# Patient Record
Sex: MALE | Employment: FULL TIME | URBAN - METROPOLITAN AREA
[De-identification: names, ages, dates, MRNs, and addresses within clinical notes are randomized per-mention and may not be internally consistent; named-entity substitution may affect disease eponyms.]

---

## 2017-07-08 ENCOUNTER — HOSPITAL ENCOUNTER (OUTPATIENT)
Age: 32
Discharge: HOME | End: 2017-07-08
Attending: EMERGENCY MEDICINE
Payer: COMMERCIAL

## 2017-07-08 ENCOUNTER — APPOINTMENT (OUTPATIENT)
Dept: CT IMAGING | Age: 32
End: 2017-07-08
Attending: EMERGENCY MEDICINE
Payer: COMMERCIAL

## 2017-07-08 VITALS
OXYGEN SATURATION: 99 % | DIASTOLIC BLOOD PRESSURE: 97 MMHG | BODY MASS INDEX: 30 KG/M2 | WEIGHT: 210 LBS | SYSTOLIC BLOOD PRESSURE: 132 MMHG | TEMPERATURE: 98 F | RESPIRATION RATE: 16 BRPM | HEART RATE: 84 BPM

## 2017-07-08 DIAGNOSIS — K59.00 CONSTIPATION, UNSPECIFIED CONSTIPATION TYPE: Primary | ICD-10-CM

## 2017-07-08 LAB
#LYMPHOCYTE IC: 3.6 X10ˆ3/UL (ref 0.9–3.2)
#MXD IC: 1 X10ˆ3/UL (ref 0.1–1)
#NEUTROPHIL IC: 8 X10ˆ3/UL (ref 1.3–6.7)
CREAT SERPL-MCNC: 1 MG/DL (ref 0.7–1.2)
GLUCOSE BLD-MCNC: 114 MG/DL (ref 65–99)
HCT IC: 53.3 % (ref 37–54)
HGB IC: 17.4 G/DL (ref 13–17)
ISTAT BLOOD GAS TCO2: 26 MMOL/L (ref 22–32)
ISTAT BUN: 18 MG/DL (ref 8–20)
ISTAT CHLORIDE: 98 MMOL/L (ref 101–111)
ISTAT HEMATOCRIT: 53 % (ref 37–54)
ISTAT IONIZED CALCIUM: 1.08 MMOL/L (ref 1.12–1.32)
ISTAT POTASSIUM: 4.5 MMOL/L (ref 3.6–5.1)
ISTAT SODIUM: 137 MMOL/L (ref 136–144)
LYMPHOCYTES NFR BLD AUTO: 28.3 %
MCH IC: 28.2 PG (ref 27–33.2)
MCHC IC: 32.6 G/DL (ref 31–37)
MCV IC: 86.2 FL (ref 80–99)
MIXED CELL %: 8.2 %
NEUTROPHILS NFR BLD AUTO: 63.5 %
PLT IC: 371 X10ˆ3/UL (ref 150–450)
POCT BILIRUBIN URINE: NEGATIVE
POCT GLUCOSE URINE: NEGATIVE MG/DL
POCT KETONE URINE: NEGATIVE MG/DL
POCT LEUKOCYTE ESTERASE URINE: NEGATIVE
POCT NITRITE URINE: NEGATIVE
POCT PH URINE: 6 (ref 5–8)
POCT SPECIFIC GRAVITY URINE: 1.02
POCT URINE CLARITY: CLEAR
POCT URINE COLOR: YELLOW
POCT UROBILINOGEN URINE: 0.2 MG/DL
RBC IC: 6.18 X10ˆ6/UL (ref 4.3–5.7)
WBC IC: 12.6 X10ˆ3/UL (ref 4–13)

## 2017-07-08 PROCEDURE — 99215 OFFICE O/P EST HI 40 MIN: CPT

## 2017-07-08 PROCEDURE — 85025 COMPLETE CBC W/AUTO DIFF WBC: CPT | Performed by: EMERGENCY MEDICINE

## 2017-07-08 PROCEDURE — 96374 THER/PROPH/DIAG INJ IV PUSH: CPT

## 2017-07-08 PROCEDURE — 81002 URINALYSIS NONAUTO W/O SCOPE: CPT | Performed by: EMERGENCY MEDICINE

## 2017-07-08 PROCEDURE — 74176 CT ABD & PELVIS W/O CONTRAST: CPT | Performed by: EMERGENCY MEDICINE

## 2017-07-08 PROCEDURE — 99214 OFFICE O/P EST MOD 30 MIN: CPT

## 2017-07-08 PROCEDURE — 80047 BASIC METABLC PNL IONIZED CA: CPT

## 2017-07-08 RX ORDER — DICYCLOMINE HCL 20 MG
20 TABLET ORAL ONCE
Status: COMPLETED | OUTPATIENT
Start: 2017-07-08 | End: 2017-07-08

## 2017-07-08 RX ORDER — KETOROLAC TROMETHAMINE 30 MG/ML
30 INJECTION, SOLUTION INTRAMUSCULAR; INTRAVENOUS ONCE
Status: COMPLETED | OUTPATIENT
Start: 2017-07-08 | End: 2017-07-08

## 2017-07-08 RX ORDER — DICYCLOMINE HCL 20 MG
20 TABLET ORAL 4 TIMES DAILY PRN
Qty: 30 TABLET | Refills: 0 | Status: SHIPPED | OUTPATIENT
Start: 2017-07-08 | End: 2017-08-07

## 2017-07-08 RX ORDER — POLYETHYLENE GLYCOL 3350 17 G/17G
17 POWDER, FOR SOLUTION ORAL DAILY PRN
Qty: 12 EACH | Refills: 0 | Status: SHIPPED | OUTPATIENT
Start: 2017-07-08 | End: 2017-08-07

## 2017-07-08 RX ORDER — SODIUM CHLORIDE 9 MG/ML
1000 INJECTION, SOLUTION INTRAVENOUS ONCE
Status: DISCONTINUED | OUTPATIENT
Start: 2017-07-08 | End: 2017-07-08

## 2017-07-08 NOTE — ED PROVIDER NOTES
Patient presents with:  Abdominal Pain    HPI:     Anitha Faust is a 27year old male who presents with chief complaint of RLQ abdominal pain. Started 2 days ago with some cramping to the R lower abdomen.   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  LUNG BASES:         Clear      CONCLUSION:  No CT evidence of appendicitis. No nephrolithiasis or obstructing urinary calculus.   Moderate constipation   Dictated by: Rose De Santiago MD on 7/08/2017 at 13:22     Approved by: Rose De Santiago MD

## 2017-07-08 NOTE — ED NOTES
Pt back from CT via wheelchair, Pt moved to room 3 dt anxiety of needles and pain. Pain is 7/10 at this time. Side rails up and call light within reach, IVF's infusing, lights dimmed, RN will continue to monitor.

## 2017-07-08 NOTE — ED INITIAL ASSESSMENT (HPI)
X2 days Pt c/o right lower abd pain, +vomiting in AM with chills but states he feel ok the rest of the day. Denies fevers or issues with urination and BM's WNL.

## 2017-07-21 ENCOUNTER — CHARTING TRANS (OUTPATIENT)
Dept: OTHER | Age: 32
End: 2017-07-21

## 2024-03-15 ENCOUNTER — HOSPITAL ENCOUNTER (INPATIENT)
Facility: HOSPITAL | Age: 39
LOS: 4 days | Discharge: HOME | End: 2024-03-20
Attending: EMERGENCY MEDICINE | Admitting: HOSPITALIST
Payer: MEDICAID

## 2024-03-15 DIAGNOSIS — F10.929 ALCOHOLIC INTOXICATION WITH COMPLICATION (HCC): ICD-10-CM

## 2024-03-15 DIAGNOSIS — R74.8 ELEVATED LIVER ENZYMES: ICD-10-CM

## 2024-03-15 DIAGNOSIS — F10.10 ALCOHOL ABUSE: ICD-10-CM

## 2024-03-15 DIAGNOSIS — F10.930 ALCOHOL WITHDRAWAL SYNDROME WITHOUT COMPLICATION (HCC): Primary | ICD-10-CM

## 2024-03-15 LAB
ALBUMIN SERPL-MCNC: 4.2 G/DL (ref 3.4–5)
ALBUMIN/GLOB SERPL: 0.9 {RATIO} (ref 1–2)
ALP LIVER SERPL-CCNC: 70 U/L
ALT SERPL-CCNC: 162 U/L
AMPHET UR QL SCN: NEGATIVE
ANION GAP SERPL CALC-SCNC: 10 MMOL/L (ref 0–18)
AST SERPL-CCNC: 154 U/L (ref 15–37)
BASOPHILS # BLD AUTO: 0.06 X10(3) UL (ref 0–0.2)
BASOPHILS NFR BLD AUTO: 0.8 %
BENZODIAZ UR QL SCN: NEGATIVE
BILIRUB SERPL-MCNC: 0.4 MG/DL (ref 0.1–2)
BILIRUB UR QL STRIP.AUTO: NEGATIVE
BUN BLD-MCNC: 3 MG/DL (ref 9–23)
CALCIUM BLD-MCNC: 9.5 MG/DL (ref 8.5–10.1)
CHLORIDE SERPL-SCNC: 101 MMOL/L (ref 98–112)
CLARITY UR REFRACT.AUTO: CLEAR
CO2 SERPL-SCNC: 26 MMOL/L (ref 21–32)
COCAINE UR QL: NEGATIVE
COLOR UR AUTO: COLORLESS
CREAT BLD-MCNC: 0.82 MG/DL
CREAT UR-SCNC: 39.6 MG/DL
EGFRCR SERPLBLD CKD-EPI 2021: 115 ML/MIN/1.73M2 (ref 60–?)
EOSINOPHIL # BLD AUTO: 0.1 X10(3) UL (ref 0–0.7)
EOSINOPHIL NFR BLD AUTO: 1.3 %
ERYTHROCYTE [DISTWIDTH] IN BLOOD BY AUTOMATED COUNT: 14.8 %
ETHANOL SERPL-MCNC: 355 MG/DL (ref ?–3)
GLOBULIN PLAS-MCNC: 4.7 G/DL (ref 2.8–4.4)
GLUCOSE BLD-MCNC: 146 MG/DL (ref 70–99)
GLUCOSE UR STRIP.AUTO-MCNC: NORMAL MG/DL
HCT VFR BLD AUTO: 42.9 %
HGB BLD-MCNC: 14.9 G/DL
IMM GRANULOCYTES # BLD AUTO: 0.02 X10(3) UL (ref 0–1)
IMM GRANULOCYTES NFR BLD: 0.3 %
KETONES UR STRIP.AUTO-MCNC: NEGATIVE MG/DL
LEUKOCYTE ESTERASE UR QL STRIP.AUTO: NEGATIVE
LYMPHOCYTES # BLD AUTO: 1.53 X10(3) UL (ref 1–4)
LYMPHOCYTES NFR BLD AUTO: 20.3 %
MCH RBC QN AUTO: 29.6 PG (ref 26–34)
MCHC RBC AUTO-ENTMCNC: 34.7 G/DL (ref 31–37)
MCV RBC AUTO: 85.1 FL
MDMA UR QL SCN: NEGATIVE
MONOCYTES # BLD AUTO: 0.46 X10(3) UL (ref 0.1–1)
MONOCYTES NFR BLD AUTO: 6.1 %
NEUTROPHILS # BLD AUTO: 5.36 X10 (3) UL (ref 1.5–7.7)
NEUTROPHILS # BLD AUTO: 5.36 X10(3) UL (ref 1.5–7.7)
NEUTROPHILS NFR BLD AUTO: 71.2 %
NITRITE UR QL STRIP.AUTO: NEGATIVE
OPIATES UR QL SCN: NEGATIVE
OSMOLALITY SERPL CALC.SUM OF ELEC: 283 MOSM/KG (ref 275–295)
OXYCODONE UR QL SCN: NEGATIVE
PH UR STRIP.AUTO: 7.5 [PH] (ref 5–8)
PLATELET # BLD AUTO: 113 10(3)UL (ref 150–450)
POTASSIUM SERPL-SCNC: 3.6 MMOL/L (ref 3.5–5.1)
PROT SERPL-MCNC: 8.9 G/DL (ref 6.4–8.2)
PROT UR STRIP.AUTO-MCNC: 20 MG/DL
RBC # BLD AUTO: 5.04 X10(6)UL
RBC UR QL AUTO: NEGATIVE
SODIUM SERPL-SCNC: 137 MMOL/L (ref 136–145)
SP GR UR STRIP.AUTO: 1 (ref 1–1.03)
UROBILINOGEN UR STRIP.AUTO-MCNC: NORMAL MG/DL
WBC # BLD AUTO: 7.5 X10(3) UL (ref 4–11)

## 2024-03-15 RX ORDER — DOXEPIN HYDROCHLORIDE 50 MG/1
1 CAPSULE ORAL ONCE
Status: COMPLETED | OUTPATIENT
Start: 2024-03-15 | End: 2024-03-15

## 2024-03-15 RX ORDER — THIAMINE HYDROCHLORIDE 100 MG/ML
100 INJECTION, SOLUTION INTRAMUSCULAR; INTRAVENOUS ONCE
Status: COMPLETED | OUTPATIENT
Start: 2024-03-15 | End: 2024-03-15

## 2024-03-15 RX ORDER — LORAZEPAM 2 MG/ML
2 INJECTION INTRAMUSCULAR ONCE
Status: COMPLETED | OUTPATIENT
Start: 2024-03-15 | End: 2024-03-15

## 2024-03-15 RX ORDER — DIAZEPAM 5 MG/ML
5 INJECTION, SOLUTION INTRAMUSCULAR; INTRAVENOUS ONCE
Status: DISCONTINUED | OUTPATIENT
Start: 2024-03-15 | End: 2024-03-15

## 2024-03-15 RX ORDER — ACETAMINOPHEN 500 MG
1000 TABLET ORAL ONCE
Status: COMPLETED | OUTPATIENT
Start: 2024-03-15 | End: 2024-03-15

## 2024-03-15 RX ORDER — ONDANSETRON 4 MG/1
4 TABLET, ORALLY DISINTEGRATING ORAL ONCE
Status: COMPLETED | OUTPATIENT
Start: 2024-03-15 | End: 2024-03-15

## 2024-03-15 NOTE — ED INITIAL ASSESSMENT (HPI)
Pt wheeled into the ED by his uncle with c/o Etoh detox. Pt last drink was 30 mins ago. Unknown Withdrawal s/sx. Pt nauseous in waiting room

## 2024-03-15 NOTE — ED NOTES
Attempted to draw pt's blood. Pt started screaming and punching wall stating, \"you will not get my blood, no needles.\" Pt informed that a blood draw will be needed for evaluation and detox. Pt wheeled back to the lobby at this time.

## 2024-03-15 NOTE — DISCHARGE INSTRUCTIONS
Substance Abuse Treatment  [facility] - [phone] Ages: 19 and up  [address] [place] [address]  Services:  ? Outpatient treatment  ? Partial hospitalization program  ? Intensive outpatient program  Insurances:  ? Medicare  ? Medicaid: [employer], [place], [employer] Better [employer], Straight Medicaid  ? Federal  insurance  ? Private insurance  ? Self-pay  [facility] - [phone] Ages: 18 and up  [address] [place] [address]  Services:  ? Outpatient detox  ? Outpatient treatment  Insurances:  ? Medicare  ? Medicaid: [place], [facility], [place]  ? Private insurance  ? Self-pay  [facility] - [phone] Ages: 18 and up  [address] [place] [address]  Services:  ? Outpatient treatment  ? Residential treatment  ? Partial hospitalization program  ? Intensive outpatient program  1  Insurances:  ? Medicaid: [place], [facility], [facility], [employer] Better [employer], [place], straight  medicaid  ? Private insurance  ? Self-pay  [facility] - [phone] Ages: 12 and up  [address] [place] [address]  Services:  ? Outpatient treatment  ? Outpatient detox  Insurances:  ? Medicare  ? Medicaid: all Medicaids accept [facility]  ? Private insurance  ? Self-pay  [facility] - [phone] Ages: 13 and up  [address] [place] [address]  Services:  ? Partial hospitalization program  ? Intensive outpatient program  ? Outpatient detox  Insurances:  ? Private insurance  ? Straight Medicaid 20 and younger  ? No Medicare  [facility] - [phone] Ages: 14 and up  [address] [place] [address]  Services:  ? Outpatient therapy  ? Outpatient detox  2  Insurances:  ? BCBS PPO  ? Self-pay  [facility] - [phone] Ages: 18 and up  [address] [place] [address]  Services:  ? Inpatient  ? Outpatient  ? Partial  hospitalization program  ? Intensive outpatient program  ? Residential detox  Insurances:  ? Private insurance  ? Self-pay  [facility] - [phone] Ages: 13 and up  [address] [place] [address]  Services:  ? Outpatient treatment  ? Inpatient detox  ? Intensive outpatient treatment  Insurances:  ? Medicare  ? Medicaid: all Medicaids  ? Federal  insurance  ? Private insurance  ? Self-pay  [facility] - [phone] Ages: 18 and up  [address] [place] [address]  Services:  ? Outpatient treatment  3  ? Residential treatment  ? Intensive outpatient program  Insurances:  ? Medicaid: all Medicaids accept [facility]  ? Private Insurance  ? Self-pay  [facility] - [phone] Ages: 18 and up  [address] [facility] [address] [place] [address]  Services:  ? Outpatient  ? Outpatient detox  ? Intensive outpatient program  ? Outpatient methadone/buprenorphine or naltrexone treatment  Insurances:  ? BCBS PPO  ? VA insurance  ? Some Medicare plans  ? No Medicaid  ? Self-pay on a sliding scale  [facility] - [phone] Ages: 18 and up  [address] [place] [address]  Services:  ? Outpatient  ? Outpatient detox  ? Intensive outpatient program  ? Outpatient methadone/buprenorphine or naltrexone treatment  Insurances:  ? [employer] PPO  ? VA insurance  ? Some Medicare plans  ? No Medicaid  ? Self-pay on a sliding scale      Patient has been scheduled to be admitted into [facility]   On 3-25-24 for their 28 day residential program.

## 2024-03-16 PROBLEM — F10.929 ALCOHOLIC INTOXICATION WITH COMPLICATION (HCC): Status: ACTIVE | Noted: 2024-03-16

## 2024-03-16 PROBLEM — I10 PRIMARY HYPERTENSION: Status: ACTIVE | Noted: 2024-03-16

## 2024-03-16 PROBLEM — F10.10 ALCOHOL ABUSE: Status: ACTIVE | Noted: 2024-03-16

## 2024-03-16 PROBLEM — D69.6 THROMBOCYTOPENIA (HCC): Status: ACTIVE | Noted: 2024-03-16

## 2024-03-16 PROBLEM — F10.930 ALCOHOL WITHDRAWAL SYNDROME WITHOUT COMPLICATION (HCC): Status: ACTIVE | Noted: 2024-03-16

## 2024-03-16 PROBLEM — R74.8 ELEVATED LIVER ENZYMES: Status: ACTIVE | Noted: 2024-03-16

## 2024-03-16 LAB — SARS-COV-2 RNA RESP QL NAA+PROBE: NOT DETECTED

## 2024-03-16 PROCEDURE — 99223 1ST HOSP IP/OBS HIGH 75: CPT | Performed by: HOSPITALIST

## 2024-03-16 PROCEDURE — HZ2ZZZZ DETOXIFICATION SERVICES FOR SUBSTANCE ABUSE TREATMENT: ICD-10-PCS | Performed by: HOSPITALIST

## 2024-03-16 RX ORDER — DIPHENHYDRAMINE HYDROCHLORIDE 50 MG/ML
25 INJECTION INTRAMUSCULAR; INTRAVENOUS ONCE
Status: COMPLETED | OUTPATIENT
Start: 2024-03-16 | End: 2024-03-16

## 2024-03-16 RX ORDER — DIAZEPAM 2 MG/1
5 TABLET ORAL EVERY 30 MIN PRN
Status: DISCONTINUED | OUTPATIENT
Start: 2024-03-16 | End: 2024-03-18 | Stop reason: HOSPADM

## 2024-03-16 RX ORDER — PROCHLORPERAZINE EDISYLATE 5 MG/ML
5 INJECTION INTRAMUSCULAR; INTRAVENOUS EVERY 8 HOURS PRN
Status: DISCONTINUED | OUTPATIENT
Start: 2024-03-16 | End: 2024-03-20

## 2024-03-16 RX ORDER — LORAZEPAM 2 MG/ML
2 INJECTION INTRAMUSCULAR
Status: DISCONTINUED | OUTPATIENT
Start: 2024-03-16 | End: 2024-03-20

## 2024-03-16 RX ORDER — ONDANSETRON 2 MG/ML
4 INJECTION INTRAMUSCULAR; INTRAVENOUS EVERY 6 HOURS PRN
Status: DISCONTINUED | OUTPATIENT
Start: 2024-03-16 | End: 2024-03-20

## 2024-03-16 RX ORDER — ACETAMINOPHEN 500 MG
500 TABLET ORAL EVERY 4 HOURS PRN
Status: DISCONTINUED | OUTPATIENT
Start: 2024-03-16 | End: 2024-03-20

## 2024-03-16 RX ORDER — DIAZEPAM 5 MG/ML
15 INJECTION, SOLUTION INTRAMUSCULAR; INTRAVENOUS
Status: DISCONTINUED | OUTPATIENT
Start: 2024-03-16 | End: 2024-03-18 | Stop reason: HOSPADM

## 2024-03-16 RX ORDER — THIAMINE HYDROCHLORIDE 100 MG/ML
100 INJECTION, SOLUTION INTRAMUSCULAR; INTRAVENOUS DAILY
Status: DISCONTINUED | OUTPATIENT
Start: 2024-03-16 | End: 2024-03-18

## 2024-03-16 RX ORDER — LORAZEPAM 2 MG/ML
3 INJECTION INTRAMUSCULAR
Status: DISCONTINUED | OUTPATIENT
Start: 2024-03-16 | End: 2024-03-16

## 2024-03-16 RX ORDER — LORAZEPAM 2 MG/ML
5 INJECTION INTRAMUSCULAR
Status: DISCONTINUED | OUTPATIENT
Start: 2024-03-16 | End: 2024-03-16

## 2024-03-16 RX ORDER — CHLORDIAZEPOXIDE HYDROCHLORIDE 25 MG/1
25 CAPSULE, GELATIN COATED ORAL EVERY 24 HOURS
Qty: 1 CAPSULE | Refills: 0 | Status: COMPLETED | OUTPATIENT
Start: 2024-03-19 | End: 2024-03-19

## 2024-03-16 RX ORDER — SENNOSIDES 8.6 MG
17.2 TABLET ORAL NIGHTLY PRN
Status: DISCONTINUED | OUTPATIENT
Start: 2024-03-16 | End: 2024-03-20

## 2024-03-16 RX ORDER — DOXEPIN HYDROCHLORIDE 50 MG/1
1 CAPSULE ORAL DAILY
Status: DISCONTINUED | OUTPATIENT
Start: 2024-03-16 | End: 2024-03-20

## 2024-03-16 RX ORDER — LORAZEPAM 1 MG/1
3 TABLET ORAL
Status: DISCONTINUED | OUTPATIENT
Start: 2024-03-16 | End: 2024-03-16

## 2024-03-16 RX ORDER — DIAZEPAM 5 MG/ML
5 INJECTION, SOLUTION INTRAMUSCULAR; INTRAVENOUS EVERY 30 MIN PRN
Status: DISCONTINUED | OUTPATIENT
Start: 2024-03-16 | End: 2024-03-18 | Stop reason: HOSPADM

## 2024-03-16 RX ORDER — CHLORDIAZEPOXIDE HYDROCHLORIDE 25 MG/1
25 CAPSULE, GELATIN COATED ORAL EVERY 12 HOURS
Qty: 2 CAPSULE | Refills: 0 | Status: COMPLETED | OUTPATIENT
Start: 2024-03-18 | End: 2024-03-18

## 2024-03-16 RX ORDER — KETOROLAC TROMETHAMINE 15 MG/ML
15 INJECTION, SOLUTION INTRAMUSCULAR; INTRAVENOUS ONCE
Status: COMPLETED | OUTPATIENT
Start: 2024-03-16 | End: 2024-03-16

## 2024-03-16 RX ORDER — CHLORDIAZEPOXIDE HYDROCHLORIDE 25 MG/1
25 CAPSULE, GELATIN COATED ORAL EVERY 6 HOURS
Qty: 4 CAPSULE | Refills: 0 | Status: COMPLETED | OUTPATIENT
Start: 2024-03-16 | End: 2024-03-17

## 2024-03-16 RX ORDER — LORAZEPAM 2 MG/ML
1 INJECTION INTRAMUSCULAR EVERY 4 HOURS PRN
Status: DISCONTINUED | OUTPATIENT
Start: 2024-03-16 | End: 2024-03-16

## 2024-03-16 RX ORDER — LORAZEPAM 2 MG/ML
6 INJECTION INTRAMUSCULAR EVERY 10 MIN PRN
Status: DISCONTINUED | OUTPATIENT
Start: 2024-03-16 | End: 2024-03-16

## 2024-03-16 RX ORDER — POLYETHYLENE GLYCOL 3350 17 G/17G
17 POWDER, FOR SOLUTION ORAL DAILY PRN
Status: DISCONTINUED | OUTPATIENT
Start: 2024-03-16 | End: 2024-03-20

## 2024-03-16 RX ORDER — MELATONIN
3 NIGHTLY PRN
Status: DISCONTINUED | OUTPATIENT
Start: 2024-03-16 | End: 2024-03-20

## 2024-03-16 RX ORDER — BISACODYL 10 MG
10 SUPPOSITORY, RECTAL RECTAL
Status: DISCONTINUED | OUTPATIENT
Start: 2024-03-16 | End: 2024-03-20

## 2024-03-16 RX ORDER — ENOXAPARIN SODIUM 100 MG/ML
40 INJECTION SUBCUTANEOUS DAILY
Status: DISCONTINUED | OUTPATIENT
Start: 2024-03-16 | End: 2024-03-20

## 2024-03-16 RX ORDER — LORAZEPAM 1 MG/1
1 TABLET ORAL
Status: DISCONTINUED | OUTPATIENT
Start: 2024-03-16 | End: 2024-03-20

## 2024-03-16 RX ORDER — CHLORDIAZEPOXIDE HYDROCHLORIDE 25 MG/1
25 CAPSULE, GELATIN COATED ORAL EVERY 8 HOURS
Qty: 3 CAPSULE | Refills: 0 | Status: COMPLETED | OUTPATIENT
Start: 2024-03-17 | End: 2024-03-17

## 2024-03-16 RX ORDER — LORAZEPAM 2 MG/ML
2 INJECTION INTRAMUSCULAR EVERY 2 HOUR PRN
Status: DISCONTINUED | OUTPATIENT
Start: 2024-03-16 | End: 2024-03-16

## 2024-03-16 RX ORDER — LORAZEPAM 1 MG/1
1 TABLET ORAL EVERY 4 HOURS PRN
Status: DISCONTINUED | OUTPATIENT
Start: 2024-03-16 | End: 2024-03-16

## 2024-03-16 RX ORDER — LORAZEPAM 1 MG/1
2 TABLET ORAL
Status: DISCONTINUED | OUTPATIENT
Start: 2024-03-16 | End: 2024-03-20

## 2024-03-16 RX ORDER — METOCLOPRAMIDE HYDROCHLORIDE 5 MG/ML
10 INJECTION INTRAMUSCULAR; INTRAVENOUS ONCE
Status: COMPLETED | OUTPATIENT
Start: 2024-03-16 | End: 2024-03-16

## 2024-03-16 RX ORDER — LORAZEPAM 2 MG/ML
4 INJECTION INTRAMUSCULAR EVERY 30 MIN PRN
Status: DISCONTINUED | OUTPATIENT
Start: 2024-03-16 | End: 2024-03-16

## 2024-03-16 RX ORDER — LORAZEPAM 1 MG/1
2 TABLET ORAL EVERY 2 HOUR PRN
Status: DISCONTINUED | OUTPATIENT
Start: 2024-03-16 | End: 2024-03-16

## 2024-03-16 RX ORDER — DIAZEPAM 5 MG/ML
10 INJECTION, SOLUTION INTRAMUSCULAR; INTRAVENOUS
Status: DISCONTINUED | OUTPATIENT
Start: 2024-03-16 | End: 2024-03-18 | Stop reason: HOSPADM

## 2024-03-16 RX ORDER — LORAZEPAM 2 MG/ML
1 INJECTION INTRAMUSCULAR
Status: DISCONTINUED | OUTPATIENT
Start: 2024-03-16 | End: 2024-03-20

## 2024-03-16 RX ORDER — TRAMADOL HYDROCHLORIDE 50 MG/1
50 TABLET ORAL EVERY 6 HOURS PRN
Status: DISCONTINUED | OUTPATIENT
Start: 2024-03-16 | End: 2024-03-20

## 2024-03-16 RX ORDER — KETOROLAC TROMETHAMINE 15 MG/ML
15 INJECTION, SOLUTION INTRAMUSCULAR; INTRAVENOUS EVERY 6 HOURS PRN
Status: DISPENSED | OUTPATIENT
Start: 2024-03-16 | End: 2024-03-18

## 2024-03-16 RX ORDER — ACETAMINOPHEN 500 MG
1000 TABLET ORAL ONCE
Status: COMPLETED | OUTPATIENT
Start: 2024-03-16 | End: 2024-03-16

## 2024-03-16 RX ORDER — ENEMA 19; 7 G/133ML; G/133ML
1 ENEMA RECTAL ONCE AS NEEDED
Status: DISCONTINUED | OUTPATIENT
Start: 2024-03-16 | End: 2024-03-20

## 2024-03-16 RX ORDER — TRAZODONE HYDROCHLORIDE 50 MG/1
50 TABLET ORAL NIGHTLY PRN
Status: DISCONTINUED | OUTPATIENT
Start: 2024-03-16 | End: 2024-03-20

## 2024-03-16 RX ORDER — SODIUM CHLORIDE 9 MG/ML
INJECTION, SOLUTION INTRAVENOUS CONTINUOUS
Status: DISCONTINUED | OUTPATIENT
Start: 2024-03-16 | End: 2024-03-20

## 2024-03-16 NOTE — ED PROVIDER NOTES
Patient Seen in: [facility] Emergency Department      History     Chief Complaint   Patient presents with    Eval-D     Stated Complaint: eval D ETOH    Subjective:   38-year-old male, presents with his uncle with complaints of intoxication.  States that he has been drinking heavily for quite some time.  Not suicidal homicidal.  Tearful on arrival limited HPI/ROS.  Most information obtained from his uncle.  Patient recently seen at [facility] ER for intoxication yesterday and was discharged home            Objective:   Past Medical History:   Diagnosis Date    Unspecified essential hypertension               History reviewed. No pertinent surgical history.             Social History     Socioeconomic History    Marital status: Single   Tobacco Use    Smoking status: Every Day     Packs/day: 1     Types: Cigarettes   Vaping Use    Vaping Use: Every day   Substance and Sexual Activity    Alcohol use: Yes     Comment: 5th of vodka daily    Drug use: Yes     Types: Cannabis     Social Determinants of Health     Food Insecurity: No Food Insecurity (3/16/2024)    Food Insecurity     Food Insecurity: Never true   Transportation Needs: No Transportation Needs (3/16/2024)    Transportation Needs     Lack of Transportation: No   Housing Stability: Low Risk  (3/16/2024)    Housing Stability     Housing Instability: No              Review of Systems   Unable to perform ROS: Acuity of condition   Intoxication    Positive for stated complaint: eval D ETOH  Other systems are as noted in HPI.  Constitutional and vital signs reviewed.      All other systems reviewed and negative except as noted above.    Physical Exam     ED Triage Vitals   BP 03/15/24 1244 (!) 159/102   Pulse 03/15/24 1244 (!) 131   Resp 03/15/24 1244 24   Temp 03/15/24 1249 98 °F (36.7 °C)   Temp src 03/15/24 1249 Temporal   SpO2 03/15/24 1244 97 %   O2 Device 03/15/24 1244 None (Room air)       Current:BP (!) 159/107 (BP Location: Left arm)   Pulse 104    Temp 97.6 °F (36.4 °C) (Oral)   Resp 18   Wt 90.7 kg   SpO2 96%   BMI 28.70 kg/m²         Physical Exam  Vitals and nursing note reviewed.   HENT:      Head: Atraumatic.   Eyes:      Pupils: Pupils are equal, round, and reactive to light.   Cardiovascular:      Rate and Rhythm: Regular rhythm. Tachycardia present.      Pulses: Normal pulses.   Pulmonary:      Effort: Pulmonary effort is normal. No respiratory distress.      Breath sounds: Normal breath sounds.   Abdominal:      General: There is no distension.      Palpations: Abdomen is soft.      Tenderness: There is no abdominal tenderness.   Musculoskeletal:         General: Normal range of motion.      Cervical back: Neck supple.   Skin:     General: Skin is warm and dry.   Neurological:      Mental Status: He is alert.   Psychiatric:      Comments: Anxious, tearful               ED Course     Labs Reviewed   COMP METABOLIC PANEL (14) - Abnormal; Notable for the following components:       Result Value    Glucose 146 (*)     BUN 3 (*)      (*)      (*)     Total Protein 8.9 (*)     Globulin  4.7 (*)     A/G Ratio 0.9 (*)     All other components within normal limits   DRUG SCREEN 7 W/OUT CONFIRMATION, URINE - Abnormal; Notable for the following components:    Cannabinoid Urine Presumed Positive (*)     All other components within normal limits    Narrative:     Results of the Urine Drug Screen should be used only for medical purposes.   URINALYSIS WITH CULTURE REFLEX - Abnormal; Notable for the following components:    Urine Color Colorless (*)     Protein Urine 20 (*)     All other components within normal limits   ETHYL ALCOHOL - Abnormal; Notable for the following components:    Ethyl Alcohol 355 (*)     All other components within normal limits   CBC W/ DIFFERENTIAL - Abnormal; Notable for the following components:    .0 (*)     All other components within normal limits   RAPID SARS-COV-2 BY PCR - Normal   CBC WITH DIFFERENTIAL  WITH PLATELET    Narrative:     The following orders were created for panel order CBC With Differential With Platelet.  Procedure                               Abnormality         Status                     ---------                               -----------         ------                     CBC W/ DIFFERENTIAL[[id number]]          Abnormal            Final result                 Please view results for these tests on the individual orders.   RAINBOW DRAW LAVENDER   RAINBOW DRAW LIGHT GREEN   RAINBOW DRAW BLUE   RAINBOW DRAW GOLD                      MDM      External chart review demonstrates some [facility] visits for alcohol intoxication.  [place] as well going back several years  Admission disposition: 3/16/2024  4:24 AM         Uncle at bedside helpful to provide information on the history presenting illness    Differential diagnosis includes, but is not limited to, gastritis, GERD, alcohol intoxication, dehydration    38-year-old male with alcohol intoxication.  Mildly hyperglycemic.  Cannabinoids in his urine.  Not suicidal homicidal.  Seen by [facility], out of network, given outpatient resources.  Will reassess when clinically sober for disposition planning to ensure safe dispo. Endorsed to oncoming physician                                       Medical Decision Making      Disposition and Plan     Clinical Impression:  1. Alcohol withdrawal syndrome without complication (HCC)    2. Alcoholic intoxication with complication (HCC)    3. Alcohol abuse    4. Elevated liver enzymes         Disposition:  Admit  3/16/2024  4:24 am    Follow-up:  Bernabe Barragan MD  [facility] [id number] [phone]    Follow up            Medications Prescribed:  There are no discharge medications for this patient.                        Hospital Problems       Present on Admission  Date Reviewed: 1/23/2014            ICD-10-CM Noted POA    * (Principal) Alcohol withdrawal syndrome without complication (HCC) F10.930  3/16/2024 Yes    Alcohol abuse F10.10 3/16/2024 Unknown    Alcoholic intoxication with complication (HCC) F10.929 3/16/2024 Unknown    Elevated liver enzymes R74.8 3/16/2024 Unknown    Primary hypertension I10 3/16/2024 Yes    Thrombocytopenia (HCC) D69.6 3/16/2024 Yes

## 2024-03-16 NOTE — ED QUICK NOTES
Report received from JOHANNE Tesfaye.     Per [facility], awaiting transport, but transport currently listed as on HOLD.

## 2024-03-16 NOTE — ED PROVIDER NOTES
Patient endorsed by outgoing physician to monitor pending sobriety with plan to discharge home with sobriety expected around 2 AM.  Patient was evaluated by [facility] and found to be out of network, outpatient resources were provided.      Patient was assessed at 1:30 AM and was found to have significant withdrawal symptoms with CIWA 19.  Patient was treated with IV Ativan per CIWA protocol with gradual improvement to CIWA 9.  Patient will need admission for further management of alcohol withdrawal.  Patient denies previous history of alcohol-related seizures.  Discussed with hospitalist Dr. Galan.

## 2024-03-16 NOTE — ED QUICK NOTES
Rounding Completed    Plan of Care reviewed. Waiting for assessment.  Elimination needs assessed.  ETOH 125 @ 230a.    Bed is locked and in lowest position. Call light within reach.

## 2024-03-16 NOTE — PLAN OF CARE
NURSING ADMISSION NOTE      Patient admitted via Cart  Oriented to room.  Safety precautions initiated.  Bed in low position.  Call light in reach.    Pt alert and oriented x 4   RA  Tele-ST  CIWA protocol  Librium taper  PRN Ativan  Seizure Precautions  PIV right AC 0.9 NS at 100ml/hr  Non-cardiac electrolyte protocol  Lovenox refused  Fall risk  C/o headache Tylenol given in ED prior to txr    Problem: PAIN - ADULT  Goal: Verbalizes/displays adequate comfort level or patient's stated pain goal  Description: INTERVENTIONS:  - Encourage pt to monitor pain and request assistance  - Assess pain using appropriate pain scale  - Administer analgesics based on type and severity of pain and evaluate response  - Implement non-pharmacological measures as appropriate and evaluate response  - Consider cultural and social influences on pain and pain management  - Manage/alleviate anxiety  - Utilize distraction and/or relaxation techniques  - Monitor for opioid side effects  - Notify MD/LIP if interventions unsuccessful or patient reports new pain  - Anticipate increased pain with activity and pre-medicate as appropriate  Outcome: Progressing     Problem: SAFETY ADULT - FALL  Goal: Free from fall injury  Description: INTERVENTIONS:  - Assess pt frequently for physical needs  - Identify cognitive and physical deficits and behaviors that affect risk of falls.  - [place] fall precautions as indicated by assessment.  - Educate pt/family on patient safety including physical limitations  - Instruct pt to call for assistance with activity based on assessment  - Modify environment to reduce risk of injury  - Provide assistive devices as appropriate  - Consider OT/PT consult to assist with strengthening/mobility  - Encourage toileting schedule  Outcome: Progressing     Problem: DISCHARGE PLANNING  Goal: Discharge to home or other facility with appropriate resources  Description: INTERVENTIONS:  - Identify barriers to discharge w/pt  and caregiver  - Include patient/family/discharge partner in discharge planning  - Arrange for needed discharge resources and transportation as appropriate  - Identify discharge learning needs (meds, wound care, etc)  - Arrange for interpreters to assist at discharge as needed  - Consider post-discharge preferences of patient/family/discharge partner  - Complete POLST form as appropriate  - Assess patient's ability to be responsible for managing their own health  - Refer to Case Management Department for coordinating discharge planning if the patient needs post-hospital services based on physician/LIP order or complex needs related to functional status, cognitive ability or social support system  Outcome: Progressing

## 2024-03-16 NOTE — ED QUICK NOTES
Pt awake and alert, skin w/d,resps appear unlabored. No tremors noted. Pt requesting apple juice and saltines. Pt c/o headache and requesting tylenol. Pt states he has had several cups of water while here in the ER.

## 2024-03-16 NOTE — H&P
[facility]     Conrad Marie Patient Status:  Emergency    10/14/1985 MRN [id number]   Location [facility] EMERGENCY DEPARTMENT Attending No att. providers found   Hosp Day # 0 PCP Tiffany Barragan MD     Chief Complaint: ETOH withdrawal    Subjective:    History of Present Illness:     Conrad Marie is a 38 year old male with hx of hypertension presents to the ED with anxiety shakiness consistent with alcohol withdrawal.  Patient's last drink was yesterday patient drinking about 1/5 of vodka a day.  Patient states he shaky a lot but denies having any known withdrawal seizures.  No fevers, chills.  Patient is having some nausea but no vomiting.  No chest pain, shortness of breath.  No hematochezia, melena, hematuria, hematemesis.    History/Other:    Past Medical History:  Past Medical History:   Diagnosis Date    Unspecified essential hypertension      Past Surgical History:   History reviewed. No pertinent surgical history.   Family History:   Family History   Problem Relation Age of Onset    Cancer Mother      Social History:    reports that he has been smoking cigarettes. He has been smoking an average of 1 pack per day. He does not have any smokeless tobacco history on file. He reports current alcohol use. He reports current drug use. Drug: Cannabis.     Allergies: No Known Allergies    Medications:    No current facility-administered medications on file prior to encounter.     No current outpatient medications on file prior to encounter.       Review of Systems:   A comprehensive review of systems was completed.    Pertinent positives and negatives noted in the HPI.    Objective:   Physical Exam:    BP (!) 160/119   Pulse 109   Temp 98.3 °F (36.8 °C)   Resp 17   Wt 200 lb (90.7 kg)   SpO2 94%   BMI 28.70 kg/m²   General: No acute distress, Alert  Respiratory: No rhonchi, no wheezes  Cardiovascular: S1, S2. Regular rate and rhythm  Abdomen: Soft,  Non-tender, non-distended, positive bowel sounds  Neuro: No new focal deficits  Extremities: No edema      Results:    Labs:      Labs Last 24 Hours:    Recent Labs   Lab 03/15/24  1640   RBC 5.04   HGB 14.9   HCT 42.9   MCV 85.1   MCH 29.6   MCHC 34.7   RDW 14.8   NEPRELIM 5.36   WBC 7.5   .0*       Recent Labs   Lab 03/15/24  1640   *   BUN 3*   CREATSERUM 0.82   EGFRCR 115   CA 9.5   ALB 4.2      K 3.6      CO2 26.0   ALKPHO 70   *   *   BILT 0.4   TP 8.9*       No results found for: \"PT\", \"INR\"    No results for input(s): \"TROP\", \"TROPHS\", \"CK\" in the last 168 hours.    No results for input(s): \"TROP\", \"PBNP\" in the last 168 hours.    No results for input(s): \"PCT\" in the last 168 hours.    Imaging: Imaging data reviewed in Epic.    Assessment & Plan:      # ETOH withdrawal   - Will continue on CIWA ptorocol   - CIWA score 9   - will admit to behavioral  medical floor    # Thrombocytopenia   - Mild   - Likely due to bone marrow supression.   - Will continue to monitor    # Hypertension   - Will continue prn blood pressure meds.    Plan of care discussed with patient at bedside    Emil Velazquez, DO    Supplementary Documentation:     The 21st Century Cures Act makes medical notes like these available to patients in the interest of transparency. Please be advised this is a medical document. Medical documents are intended to carry relevant information, facts as evident, and the clinical opinion of the practitioner. The medical note is intended as peer to peer communication and may appear blunt or direct. It is written in medical language and may contain abbreviations or verbiage that are unfamiliar.

## 2024-03-16 NOTE — ED QUICK NOTES
Orders for admission, patient is aware of plan and ready to go upstairs. Any questions, please call ED JOHANNE Tesfaye at extension [phone].     Patient Covid vaccination status: Unvaccinated     COVID Test Ordered in ED: None    COVID Suspicion at Admission: N/A    Running Infusions:  None    Mental Status/LOC at time of transport: A&Ox4    Other pertinent information:   CIWA score: 9   NIH score:  N/A

## 2024-03-17 PROCEDURE — 99233 SBSQ HOSP IP/OBS HIGH 50: CPT | Performed by: HOSPITALIST

## 2024-03-17 RX ORDER — LOSARTAN POTASSIUM 25 MG/1
25 TABLET ORAL DAILY
Status: DISCONTINUED | OUTPATIENT
Start: 2024-03-17 | End: 2024-03-20

## 2024-03-17 RX ORDER — PANTOPRAZOLE SODIUM 40 MG/1
40 TABLET, DELAYED RELEASE ORAL
Status: DISCONTINUED | OUTPATIENT
Start: 2024-03-17 | End: 2024-03-20

## 2024-03-17 RX ORDER — LABETALOL HYDROCHLORIDE 5 MG/ML
10 INJECTION, SOLUTION INTRAVENOUS ONCE
Status: DISCONTINUED | OUTPATIENT
Start: 2024-03-17 | End: 2024-03-18 | Stop reason: ALTCHOICE

## 2024-03-17 NOTE — PLAN OF CARE
Assumed care at 0730.  Patient is alert and oriented x4.  Room air.  NSR-ST on tele.  CIWA [id number].  PRN ativan given.  PRN pain meds given for HA and low back pain.  Regular diet, tolerating.  SBA to bathroom.  Normal saline @100ml/hr.  Will continue current plan of care.   Problem: PAIN - ADULT  Goal: Verbalizes/displays adequate comfort level or patient's stated pain goal  Description: INTERVENTIONS:  - Encourage pt to monitor pain and request assistance  - Assess pain using appropriate pain scale  - Administer analgesics based on type and severity of pain and evaluate response  - Implement non-pharmacological measures as appropriate and evaluate response  - Consider cultural and social influences on pain and pain management  - Manage/alleviate anxiety  - Utilize distraction and/or relaxation techniques  - Monitor for opioid side effects  - Notify MD/LIP if interventions unsuccessful or patient reports new pain  - Anticipate increased pain with activity and pre-medicate as appropriate  Outcome: Progressing     Problem: SAFETY ADULT - FALL  Goal: Free from fall injury  Description: INTERVENTIONS:  - Assess pt frequently for physical needs  - Identify cognitive and physical deficits and behaviors that affect risk of falls.  - [place] fall precautions as indicated by assessment.  - Educate pt/family on patient safety including physical limitations  - Instruct pt to call for assistance with activity based on assessment  - Modify environment to reduce risk of injury  - Provide assistive devices as appropriate  - Consider OT/PT consult to assist with strengthening/mobility  - Encourage toileting schedule  Outcome: Progressing     Problem: DISCHARGE PLANNING  Goal: Discharge to home or other facility with appropriate resources  Description: INTERVENTIONS:  - Identify barriers to discharge w/pt and caregiver  - Include patient/family/discharge partner in discharge planning  - Arrange for needed discharge resources and  transportation as appropriate  - Identify discharge learning needs (meds, wound care, etc)  - Arrange for interpreters to assist at discharge as needed  - Consider post-discharge preferences of patient/family/discharge partner  - Complete POLST form as appropriate  - Assess patient's ability to be responsible for managing their own health  - Refer to Case Management Department for coordinating discharge planning if the patient needs post-hospital services based on physician/LIP order or complex needs related to functional status, cognitive ability or social support system  Outcome: Progressing

## 2024-03-17 NOTE — PROGRESS NOTES
[facility]   part of [facility]     Hospitalist Progress Note     Conrad Marie Patient Status:  Inpatient    10/14/1985 MRN [id number]   [facility] 3NE-A Attending Devon Almeida MD   Hosp Day # 1 PCP Tiffany Barragan MD     Chief Complaint:   Chief Complaint   Patient presents with    Eval-D       Subjective:     Shakes are better but has headache.    Objective:    Review of Systems:   6  point ROS completed and was negative, except for pertinent positive and negatives stated in subjective.    Vital signs:  Temp:  [98 °F (36.7 °C)-98.5 °F (36.9 °C)] 98.5 °F (36.9 °C)  Pulse:  [] 80  Resp:  [17-20] 18  BP: (140-158)/(100-114) 143/114  SpO2:  [90 %-97 %] 90 %    Physical Exam:    General: No acute distress.   Respiratory: Clear to auscultation bilaterally. No wheezes. No rhonchi.  Cardiovascular: S1, S2. Regular rate and rhythm. No murmurs.  Abdomen: Soft, nontender, nondistended.    Extremities: No edema.    Diagnostic Data:    Labs:  Recent Labs   Lab 03/15/24  1640   WBC 7.5   HGB 14.9   MCV 85.1   .0*       Recent Labs   Lab 03/15/24  1640   *   BUN 3*   CREATSERUM 0.82   CA 9.5   ALB 4.2      K 3.6      CO2 26.0   ALKPHO 70   *   *   BILT 0.4   TP 8.9*       CrCl cannot be calculated (Unknown ideal weight.).    No results for input(s): \"PTP\", \"INR\" in the last 168 hours.         COVID-19 Lab Results    COVID-19  Lab Results   Component Value Date    COVID19 Not Detected 2024       Pro-Calcitonin  No results for input(s): \"PCT\" in the last 168 hours.    Cardiac  No results for input(s): \"TROP\", \"PBNP\" in the last 168 hours.    Creatinine Kinase  No results for input(s): \"CK\" in the last 168 hours.    Inflammatory Markers  No results for input(s): \"CRP\", \"DIANA\", \"LDH\", \"DDIMER\" in the last 168 hours.    No results for input(s): \"TROP\", \"TROPHS\", \"CK\" in the last 168 hours.    Imaging: Imaging data reviewed in  Epic.    Medications:    enoxaparin  40 mg Subcutaneous Daily    thiamine  100 mg Intravenous Daily    folic acid  1 mg Intravenous Nightly    multivitamin  1 tablet Oral Daily    chlordiazePOXIDE  25 mg Oral Q8H    Followed by    [START ON 3/18/2024] chlordiazePOXIDE  25 mg Oral Q12H    Followed by    [START ON 3/19/2024] chlordiazePOXIDE  25 mg Oral Q24H       Assessment & Plan:      # ETOH withdrawal                 - Will continue on CIWA ptorocol                 - needs to set up sponsor and AA appointmetn upon discharge   - he is also interested in IP program; await liaison to see patient     # Thrombocytopenia                 - Mild                 - Likely due to bone 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 Marie is expected to be discharge to: home

## 2024-03-18 LAB — HCV AB SERPL QL IA: NONREACTIVE

## 2024-03-18 PROCEDURE — 99232 SBSQ HOSP IP/OBS MODERATE 35: CPT | Performed by: HOSPITALIST

## 2024-03-18 RX ORDER — MELATONIN
100 DAILY
Status: DISCONTINUED | OUTPATIENT
Start: 2024-03-19 | End: 2024-03-20

## 2024-03-18 RX ORDER — FOLIC ACID 1 MG/1
1 TABLET ORAL DAILY
Status: DISCONTINUED | OUTPATIENT
Start: 2024-03-19 | End: 2024-03-20

## 2024-03-18 RX ORDER — HYDROCODONE BITARTRATE AND ACETAMINOPHEN 5; 325 MG/1; MG/1
1 TABLET ORAL EVERY 6 HOURS PRN
Status: DISCONTINUED | OUTPATIENT
Start: 2024-03-18 | End: 2024-03-20

## 2024-03-18 NOTE — PLAN OF CARE
Assumed patient care at 1930.  AxOx4  RA, VSS, NSR/ST on tele.  [facility] Protocol - anxious, tremors, headache, paroxysmal sweats  PRN pain meds given for headache per MAR.  Up stand by assist to bathroom  Regular diet  Denies nausea/vomiting.  0.9 NS infusing into the RFA IV at 100ml/hr.  Seizure precautions  Librium taper  Bed in lowest position. Call light within reach. Needs met at this time.    Problem: Patient/Family Goals  Goal: Patient/Family Long Term Goal  Description: Patient's Long Term Goal: get long term help with alcohol use    Interventions:  - meet w/  - See additional Care Plan goals for specific interventions  Outcome: Progressing  Goal: Patient/Family Short Term Goal  Description: Patient's Short Term Goal: detox    Interventions:   - follow [facility] protocol  - See additional Care Plan goals for specific interventions  Outcome: Progressing     Problem: PAIN - ADULT  Goal: Verbalizes/displays adequate comfort level or patient's stated pain goal  Description: INTERVENTIONS:  - Encourage pt to monitor pain and request assistance  - Assess pain using appropriate pain scale  - Administer analgesics based on type and severity of pain and evaluate response  - Implement non-pharmacological measures as appropriate and evaluate response  - Consider cultural and social influences on pain and pain management  - Manage/alleviate anxiety  - Utilize distraction and/or relaxation techniques  - Monitor for opioid side effects  - Notify MD/LIP if interventions unsuccessful or patient reports new pain  - Anticipate increased pain with activity and pre-medicate as appropriate  Outcome: Progressing     Problem: SAFETY ADULT - FALL  Goal: Free from fall injury  Description: INTERVENTIONS:  - Assess pt frequently for physical needs  - Identify cognitive and physical deficits and behaviors that affect risk of falls.  - [place] fall precautions as indicated by assessment.  - Educate pt/family on patient  safety including physical limitations  - Instruct pt to call for assistance with activity based on assessment  - Modify environment to reduce risk of injury  - Provide assistive devices as appropriate  - Consider OT/PT consult to assist with strengthening/mobility  - Encourage toileting schedule  Outcome: Progressing     Problem: DISCHARGE PLANNING  Goal: Discharge to home or other facility with appropriate resources  Description: INTERVENTIONS:  - Identify barriers to discharge w/pt and caregiver  - Include patient/family/discharge partner in discharge planning  - Arrange for needed discharge resources and transportation as appropriate  - Identify discharge learning needs (meds, wound care, etc)  - Arrange for interpreters to assist at discharge as needed  - Consider post-discharge preferences of patient/family/discharge partner  - Complete POLST form as appropriate  - Assess patient's ability to be responsible for managing their own health  - Refer to Case Management Department for coordinating discharge planning if the patient needs post-hospital services based on physician/LIP order or complex needs related to functional status, cognitive ability or social support system  Outcome: Progressing

## 2024-03-18 NOTE — PROGRESS NOTES
[facility]   part of [facility]     Hospitalist Progress Note     Conrad Marie Patient Status:  Inpatient    10/14/1985 MRN [id number]   [facility] 3NE-A Attending Devon Almeida MD   Hosp Day # 2 PCP Tiffany Barragan MD     Chief Complaint:   Chief Complaint   Patient presents with    Eval-D       Subjective:     Shakes and sweats overnight but now comfortable    Objective:    Review of Systems:   6  point ROS completed and was negative, except for pertinent positive and negatives stated in subjective.    Vital signs:  Temp:  [96.9 °F (36.1 °C)-98.5 °F (36.9 °C)] 96.9 °F (36.1 °C)  Pulse:  [] 83  Resp:  [18-22] 22  BP: (130-159)/() 155/110  SpO2:  [94 %-98 %] 94 %    Physical Exam:    General: No acute distress.   Respiratory: Clear to auscultation bilaterally. No wheezes. No rhonchi.  Cardiovascular: S1, S2. Regular rate and rhythm. No murmurs.  Abdomen: Soft, nontender, nondistended.    Extremities: No edema.    Diagnostic Data:    Labs:  Recent Labs   Lab 03/15/24  1640   WBC 7.5   HGB 14.9   MCV 85.1   .0*       Recent Labs   Lab 03/15/24  1640   *   BUN 3*   CREATSERUM 0.82   CA 9.5   ALB 4.2      K 3.6      CO2 26.0   ALKPHO 70   *   *   BILT 0.4   TP 8.9*       CrCl cannot be calculated (Unknown ideal weight.).    No results for input(s): \"PTP\", \"INR\" in the last 168 hours.         COVID-19 Lab Results    COVID-19  Lab Results   Component Value Date    COVID19 Not Detected 2024       Pro-Calcitonin  No results for input(s): \"PCT\" in the last 168 hours.    Cardiac  No results for input(s): \"TROP\", \"PBNP\" in the last 168 hours.    Creatinine Kinase  No results for input(s): \"CK\" in the last 168 hours.    Inflammatory Markers  No results for input(s): \"CRP\", \"DIANA\", \"LDH\", \"DDIMER\" in the last 168 hours.    No results for input(s): \"TROP\", \"TROPHS\", \"CK\" in the last 168 hours.    Imaging: Imaging data reviewed in  Epic.    Medications:    [START ON 3/19/2024] thiamine  100 mg Oral Daily    [START ON 3/19/2024] folic acid  1 mg Oral Daily    pantoprazole  40 mg Oral QAM AC    losartan  25 mg Oral Daily    enoxaparin  40 mg Subcutaneous Daily    multivitamin  1 tablet Oral Daily    chlordiazePOXIDE  25 mg Oral Q12H    Followed by    [START ON 3/19/2024] chlordiazePOXIDE  25 mg Oral Q24H       Assessment & Plan:      # ETOH withdrawal                 - Will continue on CIWA ptorocol                 - needs to set up sponsor and AA appointmetn before/upon discharge   - he is also interested in IP program; [facility] tentatively     # Thrombocytopenia                 - Mild                 - Likely due 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 Marie is expected to be discharge to: home

## 2024-03-18 NOTE — PROGRESS NOTES
Patient met with [facility]  to discuss possible treatment options. Patient requested a referral to [place] residential 28 day treatment in [facility]. [facility] currently does not have any beds available and patient was put on the wait list to be admitted on Monday 3-25-24. Rather than choose an alternative location patient accepted the date, he will be transported by his significant other. The residential program is located, [address] [place] [address], [id number],  is Vicki, [phone].

## 2024-03-18 NOTE — PLAN OF CARE
Assumed care at 0700. A&O x 4. On RA tolerating well. NSR/ST on tele. Patient is [facility] protocol. Scheduled librium given per MAR. Patient anxious and overwhelmed, PRN pain medication given for headache. Sachin from [place] saw patient. Please see epic flowsheets for more detail.         Problem: Patient/Family Goals  Goal: Patient/Family Long Term Goal  Description: Patient's Long Term Goal: get long term help with alcohol use    Interventions:  - meet w/  - See additional Care Plan goals for specific interventions  Outcome: Progressing  Goal: Patient/Family Short Term Goal  Description: Patient's Short Term Goal: detox    Interventions:   - follow [facility] protocol  - See additional Care Plan goals for specific interventions  Outcome: Progressing     Problem: PAIN - ADULT  Goal: Verbalizes/displays adequate comfort level or patient's stated pain goal  Description: INTERVENTIONS:  - Encourage pt to monitor pain and request assistance  - Assess pain using appropriate pain scale  - Administer analgesics based on type and severity of pain and evaluate response  - Implement non-pharmacological measures as appropriate and evaluate response  - Consider cultural and social influences on pain and pain management  - Manage/alleviate anxiety  - Utilize distraction and/or relaxation techniques  - Monitor for opioid side effects  - Notify MD/LIP if interventions unsuccessful or patient reports new pain  - Anticipate increased pain with activity and pre-medicate as appropriate  Outcome: Progressing     Problem: SAFETY ADULT - FALL  Goal: Free from fall injury  Description: INTERVENTIONS:  - Assess pt frequently for physical needs  - Identify cognitive and physical deficits and behaviors that affect risk of falls.  - [place] fall precautions as indicated by assessment.  - Educate pt/family on patient safety including physical limitations  - Instruct pt to call for assistance with activity based on assessment  -  Modify environment to reduce risk of injury  - Provide assistive devices as appropriate  - Consider OT/PT consult to assist with strengthening/mobility  - Encourage toileting schedule  Outcome: Progressing     Problem: DISCHARGE PLANNING  Goal: Discharge to home or other facility with appropriate resources  Description: INTERVENTIONS:  - Identify barriers to discharge w/pt and caregiver  - Include patient/family/discharge partner in discharge planning  - Arrange for needed discharge resources and transportation as appropriate  - Identify discharge learning needs (meds, wound care, etc)  - Arrange for interpreters to assist at discharge as needed  - Consider post-discharge preferences of patient/family/discharge partner  - Complete POLST form as appropriate  - Assess patient's ability to be responsible for managing their own health  - Refer to Case Management Department for coordinating discharge planning if the patient needs post-hospital services based on physician/LIP order or complex needs related to functional status, cognitive ability or social support system  Outcome: Progressing

## 2024-03-19 LAB
ALBUMIN SERPL-MCNC: 3.8 G/DL (ref 3.4–5)
ALBUMIN/GLOB SERPL: 0.8 {RATIO} (ref 1–2)
ALP LIVER SERPL-CCNC: 79 U/L
ALT SERPL-CCNC: 328 U/L
ANION GAP SERPL CALC-SCNC: 4 MMOL/L (ref 0–18)
AST SERPL-CCNC: 322 U/L (ref 15–37)
BILIRUB SERPL-MCNC: 0.3 MG/DL (ref 0.1–2)
BUN BLD-MCNC: 8 MG/DL (ref 9–23)
CALCIUM BLD-MCNC: 9.5 MG/DL (ref 8.5–10.1)
CHLORIDE SERPL-SCNC: 107 MMOL/L (ref 98–112)
CO2 SERPL-SCNC: 26 MMOL/L (ref 21–32)
CREAT BLD-MCNC: 0.8 MG/DL
EGFRCR SERPLBLD CKD-EPI 2021: 115 ML/MIN/1.73M2 (ref 60–?)
GLOBULIN PLAS-MCNC: 4.9 G/DL (ref 2.8–4.4)
GLUCOSE BLD-MCNC: 116 MG/DL (ref 70–99)
MAGNESIUM SERPL-MCNC: 1.6 MG/DL (ref 1.6–2.6)
OSMOLALITY SERPL CALC.SUM OF ELEC: 283 MOSM/KG (ref 275–295)
PHOSPHATE SERPL-MCNC: 4.3 MG/DL (ref 2.5–4.9)
POTASSIUM SERPL-SCNC: 3.8 MMOL/L (ref 3.5–5.1)
PROT SERPL-MCNC: 8.7 G/DL (ref 6.4–8.2)
SODIUM SERPL-SCNC: 137 MMOL/L (ref 136–145)

## 2024-03-19 PROCEDURE — 99232 SBSQ HOSP IP/OBS MODERATE 35: CPT | Performed by: HOSPITALIST

## 2024-03-19 RX ORDER — AMOXICILLIN AND CLAVULANATE POTASSIUM 875; 125 MG/1; MG/1
875 TABLET, FILM COATED ORAL
Status: DISCONTINUED | OUTPATIENT
Start: 2024-03-19 | End: 2024-03-20

## 2024-03-19 RX ORDER — HYDROXYZINE HYDROCHLORIDE 25 MG/1
25 TABLET, FILM COATED ORAL EVERY 6 HOURS PRN
Status: DISCONTINUED | OUTPATIENT
Start: 2024-03-19 | End: 2024-03-20

## 2024-03-19 RX ORDER — MAGNESIUM OXIDE 400 MG/1
400 TABLET ORAL ONCE
Status: COMPLETED | OUTPATIENT
Start: 2024-03-19 | End: 2024-03-19

## 2024-03-19 RX ORDER — CLONIDINE HYDROCHLORIDE 0.1 MG/1
0.1 TABLET ORAL ONCE
Status: COMPLETED | OUTPATIENT
Start: 2024-03-19 | End: 2024-03-19

## 2024-03-19 NOTE — PAYOR COMM NOTE
ADMITTED TO MEDICAL FLOOR  3/16 THRU 3/19  ADMISSION REVIEW     Payor: HARRIETT  Subscriber #:  [id number]  Authorization Number: [id number]    Admit date: 3/16/24  Admit time:  8:14 AM       REVIEW DOCUMENTATION:     ED Provider Notes        ED Provider Notes signed by Aaron Dang DO at 3/17/2024  3:40 PM       Author: Aaron Dang DO Service: -- Author Type: Physician    Filed: 3/17/2024  3:40 PM Date of Service: 3/15/2024 10:02 PM Status: Signed    : Aaron Dang DO (Physician)           Patient Seen in: [facility] [place] Emergency Department      History     Chief Complaint   Patient presents with    Eval-D     Stated Complaint: eval D ETOH    Subjective:   38-year-old male, presents with his uncle with complaints of intoxication.  States that he has been drinking heavily for quite some time.  Not suicidal homicidal.  Tearful on arrival limited HPI/ROS.  Most information obtained from his uncle.  Patient recently seen at [facility] for intoxication yesterday and was discharged home            Objective:   Past Medical History:   Diagnosis Date    Unspecified essential hypertension               History reviewed. No pertinent surgical history.             Social History     Socioeconomic History    Marital status: Single   Tobacco Use    Smoking status: Every Day     Packs/day: 1     Types: Cigarettes   Vaping Use    Vaping Use: Every day   Substance and Sexual Activity    Alcohol use: Yes     Comment: 5th of vodka daily    Drug use: Yes     Types: Cannabis     Social Determinants of Health     Food Insecurity: No Food Insecurity (3/16/2024)    Food Insecurity     Food Insecurity: Never true   Transportation Needs: No Transportation Needs (3/16/2024)    Transportation Needs     Lack of Transportation: No   Housing Stability: Low Risk  (3/16/2024)    Housing Stability     Housing Instability: No              Review of Systems   Unable to perform ROS: Acuity of condition    Intoxication    Positive for stated complaint: eval D ETOH  Other systems are as noted in HPI.  Constitutional and vital signs reviewed.      All other systems reviewed and negative except as noted above.    Physical Exam     ED Triage Vitals   BP 03/15/24 1244 (!) 159/102   Pulse 03/15/24 1244 (!) 131   Resp 03/15/24 1244 24   Temp 03/15/24 1249 98 °F (36.7 °C)   Temp src 03/15/24 1249 Temporal   SpO2 03/15/24 1244 97 %   O2 Device 03/15/24 1244 None (Room air)       Current:BP (!) 159/107 (BP Location: Left arm)   Pulse 104   Temp 97.6 °F (36.4 °C) (Oral)   Resp 18   Wt 90.7 kg   SpO2 96%   BMI 28.70 kg/m²         Physical Exam  Vitals and nursing note reviewed.   HENT:      Head: Atraumatic.   Eyes:      Pupils: Pupils are equal, round, and reactive to light.   Cardiovascular:      Rate and Rhythm: Regular rhythm. Tachycardia present.      Pulses: Normal pulses.   Pulmonary:      Effort: Pulmonary effort is normal. No respiratory distress.      Breath sounds: Normal breath sounds.   Abdominal:      General: There is no distension.      Palpations: Abdomen is soft.      Tenderness: There is no abdominal tenderness.   Musculoskeletal:         General: Normal range of motion.      Cervical back: Neck supple.   Skin:     General: Skin is warm and dry.   Neurological:      Mental Status: He is alert.   Psychiatric:      Comments: Anxious, tearful               ED Course     Labs Reviewed   COMP METABOLIC PANEL (14) - Abnormal; Notable for the following components:       Result Value    Glucose 146 (*)     BUN 3 (*)      (*)      (*)     Total Protein 8.9 (*)     Globulin  4.7 (*)     A/G Ratio 0.9 (*)     All other components within normal limits   DRUG SCREEN 7 W/OUT CONFIRMATION, URINE - Abnormal; Notable for the following components:    Cannabinoid Urine Presumed Positive (*)     All other components within normal limits    Narrative:     Results of the Urine Drug Screen should be used only  for medical purposes.   URINALYSIS WITH CULTURE REFLEX - Abnormal; Notable for the following components:    Urine Color Colorless (*)     Protein Urine 20 (*)     All other components within normal limits   ETHYL ALCOHOL - Abnormal; Notable for the following components:    Ethyl Alcohol 355 (*)     All other components within normal limits   CBC W/ DIFFERENTIAL - Abnormal; Notable for the following components:    .0 (*)     All other components within normal limits   RAPID SARS-COV-2 BY PCR - Normal   CBC WITH DIFFERENTIAL WITH PLATELET    Narrative:     The following orders were created for panel order CBC With Differential With Platelet.  Procedure                               Abnormality         Status                     ---------                               -----------         ------                     CBC W/ DIFFERENTIAL[[id number]]          Abnormal            Final result                 Please view results for these tests on the individual orders.   RAINBOW DRAW LAVENDER   RAINBOW DRAW LIGHT GREEN   RAINBOW DRAW BLUE   RAINBOW DRAW GOLD                     MDM      External chart review demonstrates some [facility] visits for alcohol intoxication.  [place] as well going back several years  Admission disposition: 3/16/2024  4:24 AM         Uncle at bedside helpful to provide information on the history presenting illness    Differential diagnosis includes, but is not limited to, gastritis, GERD, alcohol intoxication, dehydration    38-year-old male with alcohol intoxication.  Mildly hyperglycemic.  Cannabinoids in his urine.  Not suicidal homicidal.  Seen by [facility], out of network, given outpatient resources.  Will reassess when clinically sober for disposition planning to ensure safe dispo. Endorsed to oncoming physician                                       Medical Decision Making      Disposition and Plan     Clinical Impression:  1. Alcohol withdrawal syndrome without complication (HCC)    2.  Alcoholic intoxication with complication (HCC)    3. Alcohol abuse    4. Elevated liver enzymes         Disposition:  Admit  3/16/2024  4:24 am    Follow-up:  Bernabe Barragan MD  [address]-  Unit [facility] [address]  [phone]    Follow up            Medications Prescribed:  There are no discharge medications for this patient.                        Hospital Problems       Present on Admission  Date Reviewed: 1/23/2014            ICD-10-CM Noted POA    * (Principal) Alcohol withdrawal syndrome without complication (HCC) F10.930 3/16/2024 Yes    Alcohol abuse F10.10 3/16/2024 Unknown    Alcoholic intoxication with complication (HCC) F10.929 3/16/2024 Unknown    Elevated liver enzymes R74.8 3/16/2024 Unknown    Primary hypertension I10 3/16/2024 Yes    Thrombocytopenia (HCC) D69.6 3/16/2024 Yes                     Signed by Aaron Dang DO on 3/17/2024  3:40 PM       ED Provider Notes signed by Jaun Greene MD at 3/16/2024  4:43 AM       Author: Jaun Greene MD Service: -- Author Type: Physician    Filed: 3/16/2024  4:43 AM Date of Service: 3/16/2024  4:36 AM Status: Signed    : Jaun Greene MD (Physician)         Patient endorsed by outgoing physician to monitor pending sobriety with plan to discharge home with sobriety expected around 2 AM.  Patient was evaluated by [facility] and found to be out of network, outpatient resources were provided.      Patient was assessed at 1:30 AM and was found to have significant withdrawal symptoms with CIWA 19.  Patient was treated with IV Ativan per CIWA protocol with gradual improvement to CIWA 9.  Patient will need admission for further management of alcohol withdrawal.  Patient denies previous history of alcohol-related seizures.  Discussed with hospitalist Dr. Galan.        Signed by Jaun Greene MD on 3/16/2024  4:43 AM         MEDICATIONS ADMINISTERED IN LAST 1 DAY:  acetaminophen (Tylenol Extra Strength) tab 500 mg       Date Action Dose Route  User    3/19/2024 0842 Given 500 mg Oral Lillie Spivey RN          chlordiazePOXIDE (Librium) cap 25 mg       Date Action Dose Route User    3/18/2024 2026 Given 25 mg Oral Bonnevier, Taylor, RN          chlordiazePOXIDE (Librium) cap 25 mg       Date Action Dose Route User    3/19/2024 0842 Given 25 mg Oral Lillie Spivey RN          folic acid (Folvite) tab 1 mg       Date Action Dose Route User    3/19/2024 0841 Given 1 mg Oral Lillie Spivey RN          HYDROcodone-acetaminophen (Norco) 5-325 MG per tab 1 tablet       Date Action Dose Route User    3/19/2024 1305 Given 1 tablet Oral Lillie Spivey RN    3/19/2024 0442 Given 1 tablet Oral Bonnevier, Taylor, RN    3/18/2024 1836 Given 1 tablet Oral Monica Hoang RN          hydrOXYzine (Atarax) tab 25 mg       Date Action Dose Route User    3/19/2024 1305 Given 25 mg Oral Lillie Spivey RN    3/19/2024 0629 Given 25 mg Oral Bonnevier, Taylor, RN    3/19/2024 0029 Given 25 mg Oral Bonnevier, Taylor, RN          LORazepam (Ativan) tab 1 mg       Date Action Dose Route User    3/19/2024 0421 Given 1 mg Oral Bonnevier, Taylor, RN    3/18/2024 2145 Given 1 mg Oral Bonnevier, Taylor, RN    3/18/2024 1836 Given 1 mg Oral Monica Hoang RN          losartan (Cozaar) tab 25 mg       Date Action Dose Route User    3/19/2024 0842 Given 25 mg Oral Lillie Spivey RN          magnesium oxide (Mag-Ox) tab 400 mg       Date Action Dose Route User    3/19/2024 0629 Given 400 mg Oral Bonnevier, Taylor, RN          melatonin tab 3 mg       Date Action Dose Route User    3/19/2024 0029 Given 3 mg Oral Bonnevier, Taylor, RN          metoprolol tartrate (Lopressor) tab 25 mg       Date Action Dose Route User    3/19/2024 1310 Given 25 mg Oral Lillie Spivey RN          pantoprazole (Protonix) DR tab 40 mg       Date Action Dose Route User    3/19/2024 0629 Given 40 mg Oral Bonnevier, Taylor, RN          sodium chloride 0.9% infusion       Date Action Dose Route User     3/19/2024 0029 New Bag (none) Intravenous Bonnevier, Taylor, RN          multivitamin (Tab-A-Gladys/Beta Carotene) tab 1 tablet       Date Action Dose Route User    3/19/2024 0842 Given 1 tablet Oral Lillie Spivey RN          thiamine (Vitamin B1) tab 100 mg       Date Action Dose Route User    3/19/2024 0842 Given 100 mg Oral Lillie Spivey RN          traMADol (Ultram) tab 50 mg       Date Action Dose Route User    3/18/2024 1548 Given 50 mg Oral Montano Sabrina          traZODone (Desyrel) tab 50 mg       Date Action Dose Route User    3/18/2024 2145 Given 50 mg Oral Bonnevier, Taylor, RN            Vitals (last day)       Date/Time Temp Pulse Resp BP SpO2 Weight O2 Device O2 Flow Rate (L/min) [facility]    03/19/24 1200 98.4 °F (36.9 °C) 91 20 191/125 -- -- None (Room air) -- OR    03/19/24 1000 98.6 °F (37 °C) 85 -- 168/128 -- -- -- -- OR    03/19/24 0800 98 °F (36.7 °C) 81 20 149/113 -- -- -- -- OR    03/19/24 0600 -- 79 -- -- -- -- -- -- TB    03/19/24 0500 -- 91 -- -- -- -- -- -- TB    03/19/24 0400 98.2 °F (36.8 °C) 94 20 150/113 -- -- -- -- TB    03/19/24 0200 -- 69 -- 127/99 -- -- -- -- TB    03/19/24 0000 97.9 °F (36.6 °C) 73 20 143/105 98 % -- -- -- EM    03/18/24 2300 -- 74 -- -- -- -- -- -- TB    03/18/24 2145 -- 91 -- -- -- -- -- -- TB    03/18/24 2000 98.2 °F (36.8 °C) 79 20 142/100 96 % -- -- -- EM    03/18/24 1800 -- 75 -- 155/96 -- -- -- -- OR    03/18/24 1600 98.3 °F (36.8 °C) 79 20 156/111 94 % -- None (Room air) -- OR    03/18/24 1400 -- 90 -- 152/125 -- -- -- -- EC    03/18/24 1200 98.3 °F (36.8 °C) 74 20 149/110 -- -- None (Room air) -- OR    03/18/24 1000 -- 83 -- 155/110 -- -- -- -- LG    03/18/24 0831 -- 96 -- -- -- -- -- -- OR    03/18/24 0800 96.9 °F (36.1 °C) 84 22 153/116 -- -- -- -- OR    03/18/24 0400 97.7 °F (36.5 °C) 66 19 147/101 94 % -- -- -- EM    03/18/24 0000 98.2 °F (36.8 °C) 85 18 130/95 96 % -- -- -- EM          CIWA Scores (since admission)       Date/Time CIWA-Ar Total  Who    03/19/24 1000 3 RG    03/19/24 0800 2 RG    03/19/24 0600 6 TB    03/19/24 0500 6 TB    03/19/24 0400 9 TB    03/19/24 0200 3 TB    03/19/24 0000 6 TB    03/18/24 2300 5 TB    03/18/24 2145 8 TB    03/18/24 2000 5 TB    03/18/24 1800 8 LG    03/18/24 1600 6 LG    03/18/24 1400 6 LG    03/18/24 1200 6 LG    03/18/24 1000 6 LG    03/18/24 0800 9 LG    03/18/24 0600 8 SD    03/18/24 0400 9 SD    03/18/24 0000 8 SD    03/17/24 2200 14 SD    03/17/24 2154 14 SD    03/17/24 2000 9 SD    03/17/24 1800 7 LC    03/17/24 1605 6 LC    03/17/24 1600 6 LC    03/17/24 1400 8 LC    03/17/24 1200 5 LC    03/17/24 1116 9 LC    03/17/24 1000 5 LC    03/17/24 0806 9 LC    03/17/24 0600 6 LGA    03/17/24 0420 6 LGA    03/17/24 0200 6 LGA    03/17/24 0100 11 LGA    03/16/24 2200 5 LGA    03/16/24 2000 6 LGA    03/16/24 1800 7 LV    03/16/24 1600 6 LV    03/16/24 1525 10 LV    03/16/24 1400 12 LV    03/16/24 1200 5 LV    03/16/24 1100 7 LV    03/16/24 1000 6 LV    03/16/24 0815 7 LV    03/16/24 0700 5 DB    03/16/24 0630 5 DB    03/16/24 0615 2 DB    03/16/24 0530 0 DB    03/16/24 0500 6 DB    03/16/24 0445 6 DB    03/16/24 0430 9 DB    03/16/24 0415 11 DB    03/16/24 0400 9 DB    03/16/24 0342 13 DB    03/16/24 0300 11 DB    03/16/24 0243 9 DB    03/16/24 0145 5 DB    03/16/24 0122 18 DB            H&P    Chief Complaint: ETOH withdrawal        Subjective:   History of Present Illness:      Conrad Marie is a 38 year old male with hx of hypertension presents to the ED with anxiety shakiness consistent with alcohol withdrawal.  Patient's last drink was yesterday patient drinking about 1/5 of vodka a day.  Patient states he shaky a lot but denies having any known withdrawal seizures.  No fevers, chills.  Patient is having some nausea but no vomiting.  No chest pain, shortness of breath.  No hematochezia, melena, hematuria, hematemesis.      Assessment & Plan:   # ETOH withdrawal                 - Will continue on CIWA  ptorocol                 - CIWA score 9                 - will admit to behavioral  medical floor     # Thrombocytopenia                 - Mild                 - Likely due to bone marrow supression.                 - Will continue to monitor     # Hypertension                 - Will continue prn blood pressure meds.     Plan of care discussed with patient at bedside    3/17 HOSPITALIST NOTE       Subjective:   Shakes are better but has headache.    Vital signs:  Temp:  [98 °F (36.7 °C)-98.5 °F (36.9 °C)] 98.5 °F (36.9 °C)  Pulse:  [] 80  Resp:  [17-20] 18  BP: (140-158)/(100-114) 143/114  SpO2:  [90 %-97 %] 90 %     Physical Exam:    General: No acute distress.   Respiratory: Clear to auscultation bilaterally. No wheezes. No rhonchi.  Cardiovascular: S1, S2. Regular rate and rhythm. No murmurs.  Abdomen: Soft, nontender, nondistended.    Extremities: No edema.      Medications:    enoxaparin  40 mg Subcutaneous Daily    thiamine  100 mg Intravenous Daily    folic acid  1 mg Intravenous Nightly    multivitamin  1 tablet Oral Daily    chlordiazePOXIDE  25 mg Oral Q8H     Followed by    [START ON 3/18/2024] chlordiazePOXIDE  25 mg Oral Q12H     Followed by    [START ON 3/19/2024] chlordiazePOXIDE  25 mg Oral Q24H               Assessment & Plan:   # ETOH withdrawal                 - Will continue on CIWA ptorocol                 - needs to set up sponsor and AA appointmetn upon discharge                 - he is also interested in IP program; await liaison to see patient     # Thrombocytopenia                 - Mild                 - Likely due to bone marrow supression 2/2 etoh                 - Will continue to monitor     # Hypertension                 - now dose iv labetalol                 - added losartan 25mg     #mild alcoholic hepatitis-check hep c panel      3/18 HOSPITALIST NOTE    Subjective:   Shakes and sweats overnight but now comfortable     Vital signs:  Temp:  [96.9 °F (36.1 °C)-98.5 °F (36.9  °C)] 96.9 °F (36.1 °C)  Pulse:  [] 83  Resp:  [18-22] 22  BP: (130-159)/() 155/110  SpO2:  [94 %-98 %] 94 %     Physical Exam:    General: No acute distress.   Respiratory: Clear to auscultation bilaterally. No wheezes. No rhonchi.  Cardiovascular: S1, S2. Regular rate and rhythm. No murmurs.  Abdomen: Soft, nontender, nondistended.    Extremities: No edema.      Medications:    [START ON 3/19/2024] thiamine  100 mg Oral Daily    [START ON 3/19/2024] folic acid  1 mg Oral Daily    pantoprazole  40 mg Oral QAM AC    losartan  25 mg Oral Daily    enoxaparin  40 mg Subcutaneous Daily    multivitamin  1 tablet Oral Daily    chlordiazePOXIDE  25 mg Oral Q12H     Followed by    [START ON 3/19/2024] chlordiazePOXIDE  25 mg Oral Q24H               Assessment & Plan:   # ETOH withdrawal                 - Will continue on CIWA ptorocol                 - needs to set up sponsor and AA appointmetn before/upon discharge                 - he is also interested in IP program; gateway tentatively     # Thrombocytopenia                 - Mild                 - Likely due to bone marrow supression 2/2 etoh                 - Will continue to monitor     # Hypertension                 - now dose of metoprolol                 - added losartan 25mg     #mild alcoholic hepatitis-check hep c panel        Plan of care discussed with patient      3/19 HOSPITALIST NOTE    Subjective:   Overall better but still anxious; asking bout getting an ipad for court appearance at 1130 via zoom.  RN aware and trying to help with this     Vital signs:  Temp:  [97.9 °F (36.6 °C)-98.6 °F (37 °C)] 98.6 °F (37 °C)  Pulse:  [69-94] 85  Resp:  [20] 20  BP: (127-168)/() 168/128  SpO2:  [94 %-98 %] 98 %     Physical Exam:    General: anxious appearing  Respiratory: Clear to auscultation bilaterally. No wheezes. No rhonchi.  Cardiovascular: S1, S2. Regular rate and rhythm. No murmurs.  Abdomen: Soft, nontender, nondistended.    Extremities: No  edema.      Assessment & Plan:   # ETOH withdrawal                 - Will continue on CIWA ptorocol                 - needs to set up sponsor and AA appointmetn before/upon discharge                 - he is also interested in IP program; gateway tentatively for 3/21?  Per patient report, rather than 3/25?     # Thrombocytopenia                 - Mild                 - Likely due to bone marrow supression 2/2 etoh                 - Will continue to monitor     # Hypertension                 - schedule BB                 - added losartan 25mg     #mild alcoholic hepatitis-hep c negative     Discharge tomorrow probably     Plan of care discussed with patient

## 2024-03-19 NOTE — PROGRESS NOTES
[facility]   part of [facility]     Hospitalist Progress Note     Conrad Marie Patient Status:  Inpatient    10/14/1985 MRN [id number]   [facility] 3NE-A Attending Devon Almeida MD   Hosp Day # 3 PCP Tiffany Barragan MD     Chief Complaint:   Chief Complaint   Patient presents with    Eval-D       Subjective:     Overall better but still anxious; asking bout getting an ipad for court appearance at 1130 via zoom.  RN aware and trying to help with this    Objective:    Review of Systems:   4 point ROS completed and was negative, except for pertinent positive and negatives stated in subjective.    Vital signs:  Temp:  [97.9 °F (36.6 °C)-98.6 °F (37 °C)] 98.6 °F (37 °C)  Pulse:  [69-94] 85  Resp:  [20] 20  BP: (127-168)/() 168/128  SpO2:  [94 %-98 %] 98 %    Physical Exam:    General: anxious appearing  Respiratory: Clear to auscultation bilaterally. No wheezes. No rhonchi.  Cardiovascular: S1, S2. Regular rate and rhythm. No murmurs.  Abdomen: Soft, nontender, nondistended.    Extremities: No edema.    Diagnostic Data:    Labs:  Recent Labs   Lab 03/15/24  1640   WBC 7.5   HGB 14.9   MCV 85.1   .0*       Recent Labs   Lab 03/15/24  1640 24  0435   * 116*   BUN 3* 8*   CREATSERUM 0.82 0.80   CA 9.5 9.5   ALB 4.2 3.8    137   K 3.6 3.8    107   CO2 26.0 26.0   ALKPHO 70 79   * 322*   * 328*   BILT 0.4 0.3   TP 8.9* 8.7*       CrCl cannot be calculated (Unknown ideal weight.).    No results for input(s): \"PTP\", \"INR\" in the last 168 hours.         COVID-19 Lab Results    COVID-19  Lab Results   Component Value Date    COVID19 Not Detected 2024       Pro-Calcitonin  No results for input(s): \"PCT\" in the last 168 hours.    Cardiac  No results for input(s): \"TROP\", \"PBNP\" in the last 168 hours.    Creatinine Kinase  No results for input(s): \"CK\" in the last 168 hours.    Inflammatory Markers  No results for input(s): \"CRP\",  \"DIANA\", \"LDH\", \"DDIMER\" in the last 168 hours.    No results for input(s): \"TROP\", \"TROPHS\", \"CK\" in the last 168 hours.    Imaging: Imaging data reviewed in Epic.    Medications:    thiamine  100 mg Oral Daily    folic acid  1 mg Oral Daily    pantoprazole  40 mg Oral QAM AC    losartan  25 mg Oral Daily    enoxaparin  40 mg Subcutaneous Daily    multivitamin  1 tablet Oral Daily       Assessment & Plan:      # ETOH withdrawal                 - Will continue on CIWA ptorocol                 - needs to set up sponsor and AA appointmetn before/upon discharge   - he is also interested in IP program; gateway tentatively for 3/21?  Per patient report, rather than 3/25?     # Thrombocytopenia                 - Mild                 - Likely due to bone marrow supression 2/2 etoh                 - Will continue to monitor     # Hypertension                 - schedule BB   - added losartan 25mg    #mild alcoholic hepatitis-hep c negative    Discharge tomorrow probably    Plan of care discussed with patient     Devon Deluca MD    Supplementary Documentation:     Quality:  DVT Prophylaxis:  lovenox  CODE status: full  [place]: no  Central line: no      Estimated date of discharge: tbd  At this point Mr. Marie is expected to be discharge to: home

## 2024-03-19 NOTE — PLAN OF CARE
Pt is A&O x4. VSS- NSR/ST on tele. RA. Lungs clear. Pt c/o headache relieved with PRN norco. CIWA monitoring in place- ativan given per MAR. MD pagekym d/t generalized anxiety- PRN atarax ordered. IVF- 0.9NS @ 100mL/hr infusing to RAC PIV. Mag replaced per protocol. General diet. Pt up ad leann. Showered.     Problem: Patient/Family Goals  Goal: Patient/Family Long Term Goal  Description: Patient's Long Term Goal: get long term help with alcohol use    Interventions:  - meet w/  - See additional Care Plan goals for specific interventions  Outcome: Progressing  Goal: Patient/Family Short Term Goal  Description: Patient's Short Term Goal: detox    Interventions:   - follow CIWA protocol  - See additional Care Plan goals for specific interventions  Outcome: Progressing     Problem: PAIN - ADULT  Goal: Verbalizes/displays adequate comfort level or patient's stated pain goal  Description: INTERVENTIONS:  - Encourage pt to monitor pain and request assistance  - Assess pain using appropriate pain scale  - Administer analgesics based on type and severity of pain and evaluate response  - Implement non-pharmacological measures as appropriate and evaluate response  - Consider cultural and social influences on pain and pain management  - Manage/alleviate anxiety  - Utilize distraction and/or relaxation techniques  - Monitor for opioid side effects  - Notify MD/LIP if interventions unsuccessful or patient reports new pain  - Anticipate increased pain with activity and pre-medicate as appropriate  Outcome: Progressing     Problem: SAFETY ADULT - FALL  Goal: Free from fall injury  Description: INTERVENTIONS:  - Assess pt frequently for physical needs  - Identify cognitive and physical deficits and behaviors that affect risk of falls.  - [place] fall precautions as indicated by assessment.  - Educate pt/family on patient safety including physical limitations  - Instruct pt to call for assistance with activity  based on assessment  - Modify environment to reduce risk of injury  - Provide assistive devices as appropriate  - Consider OT/PT consult to assist with strengthening/mobility  - Encourage toileting schedule  Outcome: Progressing     Problem: DISCHARGE PLANNING  Goal: Discharge to home or other facility with appropriate resources  Description: INTERVENTIONS:  - Identify barriers to discharge w/pt and caregiver  - Include patient/family/discharge partner in discharge planning  - Arrange for needed discharge resources and transportation as appropriate  - Identify discharge learning needs (meds, wound care, etc)  - Arrange for interpreters to assist at discharge as needed  - Consider post-discharge preferences of patient/family/discharge partner  - Complete POLST form as appropriate  - Assess patient's ability to be responsible for managing their own health  - Refer to Case Management Department for coordinating discharge planning if the patient needs post-hospital services based on physician/LIP order or complex needs related to functional status, cognitive ability or social support system  Outcome: Progressing

## 2024-03-20 VITALS
OXYGEN SATURATION: 92 % | RESPIRATION RATE: 22 BRPM | BODY MASS INDEX: 29 KG/M2 | DIASTOLIC BLOOD PRESSURE: 105 MMHG | SYSTOLIC BLOOD PRESSURE: 136 MMHG | TEMPERATURE: 99 F | HEART RATE: 70 BPM | WEIGHT: 200 LBS

## 2024-03-20 LAB — MAGNESIUM SERPL-MCNC: 2.1 MG/DL (ref 1.6–2.6)

## 2024-03-20 PROCEDURE — 99239 HOSP IP/OBS DSCHRG MGMT >30: CPT | Performed by: STUDENT IN AN ORGANIZED HEALTH CARE EDUCATION/TRAINING PROGRAM

## 2024-03-20 RX ORDER — AMOXICILLIN AND CLAVULANATE POTASSIUM 875; 125 MG/1; MG/1
875 TABLET, FILM COATED ORAL
Qty: 14 TABLET | Refills: 0 | Status: SHIPPED | OUTPATIENT
Start: 2024-03-20 | End: 2024-03-27

## 2024-03-20 RX ORDER — ACETAMINOPHEN 500 MG
1000 TABLET ORAL EVERY 8 HOURS PRN
Qty: 90 TABLET | Refills: 0 | Status: SHIPPED | OUTPATIENT
Start: 2024-03-20 | End: 2024-04-19

## 2024-03-20 RX ORDER — LOSARTAN POTASSIUM 25 MG/1
25 TABLET ORAL DAILY
Qty: 30 TABLET | Refills: 0 | Status: SHIPPED | OUTPATIENT
Start: 2024-03-21 | End: 2024-04-20

## 2024-03-20 RX ORDER — IBUPROFEN 600 MG/1
600 TABLET ORAL ONCE
Status: COMPLETED | OUTPATIENT
Start: 2024-03-20 | End: 2024-03-20

## 2024-03-20 RX ORDER — IBUPROFEN 400 MG/1
400 TABLET ORAL ONCE
Status: COMPLETED | OUTPATIENT
Start: 2024-03-20 | End: 2024-03-20

## 2024-03-20 RX ORDER — HYDROXYZINE HYDROCHLORIDE 25 MG/1
25 TABLET, FILM COATED ORAL EVERY 6 HOURS PRN
Qty: 30 TABLET | Refills: 0 | Status: SHIPPED | OUTPATIENT
Start: 2024-03-20 | End: 2024-04-19

## 2024-03-20 NOTE — PLAN OF CARE
Pt is A&O x4. VSS- NSR on tele. RA. Lung clear. CIWA monitoring in place- low scoring. Scheduled augmentin given for R ear pain/redness. No IV access- MD aware. Seizure precautions in place. General diet. Up ad leann.   Plan for possible DC tomorrow.     Problem: Patient/Family Goals  Goal: Patient/Family Long Term Goal  Description: Patient's Long Term Goal: get long term help with alcohol use    Interventions:  - meet w/  - See additional Care Plan goals for specific interventions  Outcome: Progressing  Goal: Patient/Family Short Term Goal  Description: Patient's Short Term Goal: detox    Interventions:   - follow CIWA protocol  - See additional Care Plan goals for specific interventions  Outcome: Progressing     Problem: PAIN - ADULT  Goal: Verbalizes/displays adequate comfort level or patient's stated pain goal  Description: INTERVENTIONS:  - Encourage pt to monitor pain and request assistance  - Assess pain using appropriate pain scale  - Administer analgesics based on type and severity of pain and evaluate response  - Implement non-pharmacological measures as appropriate and evaluate response  - Consider cultural and social influences on pain and pain management  - Manage/alleviate anxiety  - Utilize distraction and/or relaxation techniques  - Monitor for opioid side effects  - Notify MD/LIP if interventions unsuccessful or patient reports new pain  - Anticipate increased pain with activity and pre-medicate as appropriate  Outcome: Progressing     Problem: SAFETY ADULT - FALL  Goal: Free from fall injury  Description: INTERVENTIONS:  - Assess pt frequently for physical needs  - Identify cognitive and physical deficits and behaviors that affect risk of falls.  - [place] fall precautions as indicated by assessment.  - Educate pt/family on patient safety including physical limitations  - Instruct pt to call for assistance with activity based on assessment  - Modify environment to reduce risk  of injury  - Provide assistive devices as appropriate  - Consider OT/PT consult to assist with strengthening/mobility  - Encourage toileting schedule  Outcome: Progressing     Problem: DISCHARGE PLANNING  Goal: Discharge to home or other facility with appropriate resources  Description: INTERVENTIONS:  - Identify barriers to discharge w/pt and caregiver  - Include patient/family/discharge partner in discharge planning  - Arrange for needed discharge resources and transportation as appropriate  - Identify discharge learning needs (meds, wound care, etc)  - Arrange for interpreters to assist at discharge as needed  - Consider post-discharge preferences of patient/family/discharge partner  - Complete POLST form as appropriate  - Assess patient's ability to be responsible for managing their own health  - Refer to Case Management Department for coordinating discharge planning if the patient needs post-hospital services based on physician/LIP order or complex needs related to functional status, cognitive ability or social support system  Outcome: Progressing

## 2024-03-20 NOTE — PLAN OF CARE
Assumed care at 0730. Pt a/ox4, on RA, NSR on telemetry. Pt with headache, PRN medications per MAR. No c/o n/v/d, SOB, dizziness/lightheadedness. IV infiltrated, pt refusing new IV, MD aware. CIWAs q2h, PRN medications per MAR for CIWAs/anxiety. Elevated BPs. Metoprolol increased to BID by Dr. Almeida, one time dose this evening of 0.1mg clonidine per MD orders. Pt with ear right ear pain this evening, Dr. Almeida notified, abx added for possible ear infection. Pt up ad leann.plan for gateway at discharge. Pt updated on POC. Will continue to monitor.

## 2024-03-20 NOTE — PLAN OF CARE
NURSING DISCHARGE NOTE    Discharged Home via Ambulatory.  Accompanied by Friend  Belongings Taken by patient/family.      Pt given discharge paperwork. IV removed. Spoke to Sachin from [place] prior to discharge. All questions answered.

## 2024-03-21 NOTE — PAYOR COMM NOTE
--------------  DISCHARGE REVIEW    Payor: [employer]  Subscriber #:  [id number]  Authorization Number: [id number]    Admit date: 3/16/24  Admit time:   8:14 AM  Discharge Date: 3/20/2024 10:58 AM     Admitting Physician: Emil Velazquez DO  Attending Physician:  No att. providers found  Primary Care Physician: Bernabe Barragan MD       Discharge Summary Notes    No notes of this type exist for this encounter.         RESENT ADMIT REVIEW ASKING FOR RECONSIDERATION INPT

## 2024-03-23 NOTE — DISCHARGE SUMMARY
[place] HOSPITALIST  DISCHARGE SUMMARY     Conrad Marie Patient Status:  Inpatient    10/14/1985 MRN [id number]   Location [facility] 3NE-A Attending No att. providers found   Hosp Day # 4 PCP Tiffany Barragan MD     Date of Admission: 3/15/2024  Date of Discharge: 3/20/2024  Discharge Disposition: Home or Self Care    Admitting Diagnosis:   Alcohol abuse [F10.10]  Elevated liver enzymes [R74.8]  Alcohol withdrawal syndrome without complication (HCC) [F10.930]  Alcoholic intoxication with complication (HCC) [F10.929]    Hospital Discharge Diagnoses:   Alcohol withdrawal  Thrombocytopenia  Hypertension  Mild alcoholic hepatitis    Lace+ Score: 24  59-90 High Risk  29-58 Medium Risk  0-28   Low Risk.    TCM Follow-Up Recommendation:  LACE < 29: Low Risk of readmission after discharge from the hospital; Still recommend for TCM follow-up.        Discharge Diagnosis:   Alcohol withdrawal    History of Present Illness: Conrad Marie is a 38 year old male with hx of hypertension presents to the ED with anxiety shakiness consistent with alcohol withdrawal.  Patient's last drink was yesterday patient drinking about 1/5 of vodka a day.  Patient states he shaky a lot but denies having any known withdrawal seizures.  No fevers, chills.  Patient is having some nausea but no vomiting.  No chest pain, shortness of breath.  No hematochezia, melena, hematuria, hematemesis.    Brief Synopsis: Patient presented with alcohol withdrawal, started on fluids, CIWA protocol, multivitamins.  Patient had uncomplicated withdrawal and discharged after monitoring for 3 days following last drink, no withdrawal symptoms noted at time of discharge.  Given resources for alcohol cessation at time of discharge.  Patient to set up inpatient alcohol withdrawal services with [facility] after discharge.    Procedures during hospitalization:   None    Incidental or significant findings and recommendations (brief descriptions):  Patient  sitting unresponsive with a and interested in patient [place] admission following discharge    Lab/Test results pending at Discharge:   None    Consultants:  None    Discharge Medication List:     Discharge Medications        START taking these medications        Instructions Prescription details   acetaminophen 500 MG Tabs  Commonly known as: Tylenol Extra Strength      Take 2 tablets (1,000 mg total) by mouth every 8 (eight) hours as needed for Fever or Pain (equal to or greater than 100.4).   Stop taking on: April 19, 2024  Quantity: 90 tablet  Refills: 0     amoxicillin clavulanate 875-125 MG Tabs  Commonly known as: Augmentin      Take 1 tablet (875 mg total) by mouth 2x Daily(Beta Blocker) for 7 days.   Stop taking on: March 27, 2024  Quantity: 14 tablet  Refills: 0     hydrOXYzine 25 MG Tabs  Commonly known as: Atarax      Take 1 tablet (25 mg total) by mouth every 6 (six) hours as needed for Anxiety.   Stop taking on: April 19, 2024  Quantity: 30 tablet  Refills: 0     losartan 25 MG Tabs  Commonly known as: Cozaar      Take 1 tablet (25 mg total) by mouth daily.   Stop taking on: April 20, 2024  Quantity: 30 tablet  Refills: 0               Where to Get Your Medications        These medications were sent to [place] [address] [place] [address] [phone], [phone]  [address] [place] [id number]      Phone: [phone]   acetaminophen 500 MG Tabs  amoxicillin clavulanate 875-125 MG Tabs  hydrOXYzine 25 MG Tabs  losartan 25 MG Tabs         [place]    Follow-up appointment:   Bernabe Barragan MD  [facility]-  [facility] [address] [phone]    Follow up      [facility]  [address] [place] [id number] [phone]  Schedule an appointment as soon as possible for a visit in 2 day(s)  For routine follow-up after hospitilization      Vital signs:       Physical Exam:  General: No acute distress  Respiratory: no wheezes, no  rhonchi  Cardiovascular: S1, S2, regular rate and rhythm  Abdomen: Soft, Non-tender, non-distended, positive bowel sounds  Neuro: No new focal deficits.   Extremities: no edema  -----------------------------------------------------------------------------------------------  PATIENT DISCHARGE INSTRUCTIONS: See electronic chart    Jatin Montero MD 3/22/2024    Time spent: 35 minutes

## 2024-03-25 ENCOUNTER — HOSPITAL ENCOUNTER (EMERGENCY)
Age: 39
Discharge: LEFT BEFORE DISCHARGE | End: 2024-03-25